# Patient Record
Sex: FEMALE | Race: WHITE | NOT HISPANIC OR LATINO | URBAN - METROPOLITAN AREA
[De-identification: names, ages, dates, MRNs, and addresses within clinical notes are randomized per-mention and may not be internally consistent; named-entity substitution may affect disease eponyms.]

---

## 2021-10-18 ENCOUNTER — HOSPITAL ENCOUNTER (OUTPATIENT)
Facility: CLINIC | Age: 21
Discharge: HOME | End: 2021-10-18
Attending: FAMILY MEDICINE
Payer: COMMERCIAL

## 2021-10-18 VITALS
RESPIRATION RATE: 16 BRPM | DIASTOLIC BLOOD PRESSURE: 70 MMHG | TEMPERATURE: 98.2 F | SYSTOLIC BLOOD PRESSURE: 120 MMHG | HEART RATE: 88 BPM | OXYGEN SATURATION: 98 %

## 2021-10-18 DIAGNOSIS — J01.40 ACUTE PANSINUSITIS, RECURRENCE NOT SPECIFIED: ICD-10-CM

## 2021-10-18 DIAGNOSIS — H60.503 ACUTE OTITIS EXTERNA OF BOTH EARS, UNSPECIFIED TYPE: Primary | ICD-10-CM

## 2021-10-18 DIAGNOSIS — H66.90 ACUTE OTITIS MEDIA, UNSPECIFIED OTITIS MEDIA TYPE: ICD-10-CM

## 2021-10-18 PROCEDURE — 99203 OFFICE O/P NEW LOW 30 MIN: CPT | Performed by: FAMILY MEDICINE

## 2021-10-18 PROCEDURE — S9083 URGENT CARE CENTER GLOBAL: HCPCS | Performed by: FAMILY MEDICINE

## 2021-10-18 RX ORDER — NEOMYCIN SULFATE, POLYMYXIN B SULFATE AND HYDROCORTISONE 10; 3.5; 1 MG/ML; MG/ML; [USP'U]/ML
4 SUSPENSION/ DROPS AURICULAR (OTIC) 3 TIMES DAILY
Qty: 4.2 ML | Refills: 0 | Status: SHIPPED | OUTPATIENT
Start: 2021-10-18 | End: 2021-10-25

## 2021-10-18 RX ORDER — AMOXICILLIN AND CLAVULANATE POTASSIUM 875; 125 MG/1; MG/1
1 TABLET, FILM COATED ORAL
Qty: 14 TABLET | Refills: 0 | Status: SHIPPED | OUTPATIENT
Start: 2021-10-18 | End: 2021-10-25

## 2021-10-18 ASSESSMENT — ENCOUNTER SYMPTOMS
SORE THROAT: 1
FEVER: 1
SINUS PRESSURE: 1
COUGH: 0
SINUS PAIN: 1

## 2021-10-18 NOTE — ED PROVIDER NOTES
History  No chief complaint on file.    20yoF presents c/o b/l ear pain and URI sx x 2 weeks. Sx include L>R ear pain. Denies drainage or changes in hearing. Notes a h/o frequent ear infections and says this feels similar. Thinks she may have had a fever. Also has congestion, sinus pressure, sore throat. Has not taken anything for her sx. Denies loss of smell or taste. Is fully vaccinated for Covid-19 and denies known Covid exposures. Says she tested negative for Covid last week.          No past medical history on file.    No past surgical history on file.    No family history on file.    Social History     Tobacco Use   • Smoking status: Not on file   • Smokeless tobacco: Not on file   Substance Use Topics   • Alcohol use: Not on file   • Drug use: Not on file       Review of Systems   Constitutional: Positive for fever.   HENT: Positive for congestion, ear pain, sinus pressure, sinus pain and sore throat. Negative for ear discharge and hearing loss.    Respiratory: Negative for cough.    All other systems reviewed and are negative.      Physical Exam  ED Triage Vitals [10/18/21 1618]   Temp Heart Rate Resp BP SpO2   36.8 °C (98.2 °F) 88 16 120/70 98 %      Temp Source Heart Rate Source Patient Position BP Location FiO2 (%) (Set)   Oral Monitor Sitting Left upper arm --       Physical Exam  Vitals reviewed.   Constitutional:       General: She is not in acute distress.     Appearance: Normal appearance. She is not ill-appearing.   HENT:      Head: Normocephalic and atraumatic.      Right Ear: Hearing and external ear normal. Drainage present. Tympanic membrane is erythematous.      Left Ear: Hearing and external ear normal. Drainage and tenderness present. A middle ear effusion is present. There is no impacted cerumen.      Ears:      Comments: Erythema in b/l canals     Nose: Congestion present.      Right Turbinates: Not enlarged.      Left Turbinates: Enlarged.      Right Sinus: Maxillary sinus tenderness and  frontal sinus tenderness present.      Left Sinus: Maxillary sinus tenderness and frontal sinus tenderness present.      Mouth/Throat:      Lips: Pink.      Mouth: Mucous membranes are moist.      Pharynx: Oropharynx is clear. Uvula midline. No oropharyngeal exudate or posterior oropharyngeal erythema.   Eyes:      Extraocular Movements: Extraocular movements intact.      Conjunctiva/sclera: Conjunctivae normal.   Cardiovascular:      Rate and Rhythm: Normal rate and regular rhythm.   Pulmonary:      Effort: Pulmonary effort is normal. No respiratory distress.      Breath sounds: Normal breath sounds.   Musculoskeletal:         General: Normal range of motion.      Cervical back: Normal range of motion and neck supple.   Lymphadenopathy:      Cervical: No cervical adenopathy.   Skin:     General: Skin is warm and dry.   Neurological:      General: No focal deficit present.      Mental Status: She is alert.   Psychiatric:         Mood and Affect: Mood normal.           Procedures  Procedures    UC Course  Clinical Impressions as of 10/18/21 1621   Acute otitis externa of both ears, unspecified type   Acute otitis media, unspecified otitis media type   Acute pansinusitis, recurrence not specified       MDM  Number of Diagnoses or Management Options  Acute otitis externa of both ears, unspecified type  Acute otitis media, unspecified otitis media type  Acute pansinusitis, recurrence not specified  Diagnosis management comments: augmentin and cortisporin otic rx  Flonase, tylenol/advil prn  F/u with PCP in a week or sooner if worse  Pt states understanding and is agreeable to plan                 Tami Marquez DO  10/18/21 1625

## 2021-10-18 NOTE — DISCHARGE INSTRUCTIONS
Take the antibiotic as prescribed  Apply the ear drops as prescribed  Flonase for congestion, runny nose, post nasal drip  Tylenol or Advil as needed for pain  Follow up with your Primary Doctor in a week or sooner if worse

## 2022-03-03 DIAGNOSIS — R10.2 PELVIC AND PERINEAL PAIN: ICD-10-CM

## 2023-03-20 ENCOUNTER — HOSPITAL ENCOUNTER (OUTPATIENT)
Facility: CLINIC | Age: 23
Discharge: HOME | End: 2023-03-20
Attending: FAMILY MEDICINE
Payer: COMMERCIAL

## 2023-03-20 VITALS
OXYGEN SATURATION: 98 % | HEART RATE: 83 BPM | DIASTOLIC BLOOD PRESSURE: 70 MMHG | RESPIRATION RATE: 17 BRPM | SYSTOLIC BLOOD PRESSURE: 131 MMHG | TEMPERATURE: 97.5 F

## 2023-03-20 DIAGNOSIS — J30.9 ALLERGIC SINUSITIS: Primary | ICD-10-CM

## 2023-03-20 PROCEDURE — S9083 URGENT CARE CENTER GLOBAL: HCPCS | Performed by: FAMILY MEDICINE

## 2023-03-20 PROCEDURE — 99213 OFFICE O/P EST LOW 20 MIN: CPT | Performed by: FAMILY MEDICINE

## 2023-03-20 RX ORDER — AMOXICILLIN 500 MG/1
500 CAPSULE ORAL 2 TIMES DAILY
Qty: 14 CAPSULE | Refills: 0 | Status: SHIPPED | OUTPATIENT
Start: 2023-03-20 | End: 2023-03-27

## 2023-03-20 ASSESSMENT — ENCOUNTER SYMPTOMS
SINUS PRESSURE: 1
NUMBNESS: 0
MYALGIAS: 0
CHILLS: 0
RHINORRHEA: 1
COUGH: 1
CHEST TIGHTNESS: 0
PALPITATIONS: 0
VOMITING: 0
DIZZINESS: 0
SORE THROAT: 0
SINUS PAIN: 0
FATIGUE: 0
FEVER: 0
ABDOMINAL PAIN: 0
SHORTNESS OF BREATH: 0
NAUSEA: 0
VOICE CHANGE: 1

## 2023-03-20 NOTE — DISCHARGE INSTRUCTIONS
Allergy season is upon us!     OTC medications recommended:   Xyzal- oral antihistamine   Rhinocort- Nasal Steroid (Budesonide 35mcg nasal spray), if Rhinocort is unavailable, next look for Nasocort (Triamcinolone 55mcg nasal spray)  __________________________________________________________  If eye irritation begins/persists:  OTC medication recommended-  Zaditor- Opthalmic (eye) Antihistamine drops.     Cool water rinsing recommended. Also, to clean the eye when it has mucous (in the mornings especially) I recommend Baby Shampoo and warm water on a washcloth to clean with the No Tears benefit.    Recommending a saline eyewash (with an eye cup for ease of use).    __________________________________________________________    Exercise proper hand hygiene.       **Check out [facility] for an allergen forecast!**   Roll up your car windows! Close those windows at home too! This will decrease your overall exposure to pollen.     **REMEMBER** Treat the PROBLEM (mucous) and your SYMPTOMS (cough, sore throat, congestion) will improve! The best remedy for mucous isn’t OTC medications, it’s saline (salt water; gargling AND sinus rinsing)!  Whatever mucous you rinse out early in your illness will decrease the chance of persistent lingering cough!    I personally recommend NeilMed All-In-One (find this over the counter or on Amazon), every 3-4hrs, 30 mins before bed and first thing in the AM. If you choose to use a nasal rinse that you must prepare,such as a Neti Pot or NeilMed squeeze bottle (remember, don't squeeze too hard!), please only use distilled water.     Salt Water gargles- as often as needed throughout the day.       Recommending no less than eight 8 oz glasses of water or tea.      Oil Diffuser with essential oils such as lavender, camphor, eucalyptus.     __________________________________________________________    Recommending Afrin (oxymetazoline) for additional relief.  You may use it NO MORE than twice  daily for NO MORE than 3 days MAX!  Refrain from using other nasal steroid (Nasacort, Rhinocort, Flonase) until you are no longer using Afrin      For additional treatment of Eustachian Tube Dysfunction:  Check out the Otovent or Eustaci for relief of discomfort from inner ear fluid build-up.    __________________________________________________________   You have been prescribed an antibiotic, only fill and take if symptoms progress/worsen.     Once started, please complete full course of antibiotic to prevent creating resistance within the bugs causing your illness.       Please eat a daily yogurt or take a daily Probiotic for GI health while taking antibiotics.  Recommending the probiotic Digestive Advantage (safe for patients 3yrs and older).    Remember to take the Probiotic *no sooner* than 4 hours *after* taking your antibiotic.  If you take the probiotic sooner than that, the good bacteria will likely be killed by the antibiotic.   __________________________________________________________    Please let us know about your experience today!   Your input is truly appreciated and helps Dr. Vivar and your team at [facility] to continue to provide a superior level of service!   Get Well Soon!

## 2023-03-20 NOTE — ED PROVIDER NOTES
History  No chief complaint on file.    Esme is a 23 yo female presenting with sinus pressure and ear pain  for  Several weeks.   She states she has a history of ear infections.          History provided by:  Patient   used: No        History reviewed. No pertinent past medical history.    History reviewed. No pertinent surgical history.    History reviewed. No pertinent family history.         Review of Systems   Constitutional: Negative for chills, fatigue and fever.   HENT: Positive for congestion, rhinorrhea, sinus pressure and voice change. Negative for sinus pain, sneezing and sore throat.    Respiratory: Positive for cough. Negative for chest tightness and shortness of breath.    Cardiovascular: Negative for chest pain and palpitations.   Gastrointestinal: Negative for abdominal pain, nausea and vomiting.   Musculoskeletal: Negative for myalgias.   Neurological: Negative for dizziness and numbness.       Physical Exam  ED Triage Vitals [03/20/23 1746]   Temp Heart Rate Resp BP SpO2   36.4 °C (97.5 °F) 83 17 131/70 98 %      Temp Source Heart Rate Source Patient Position BP Location FiO2 (%) (Set)   Oral Monitor Sitting Right upper arm --       Physical Exam  Constitutional:       General: She is not in acute distress.     Appearance: Normal appearance. She is not ill-appearing.   HENT:      Head: Normocephalic.      Nose: Congestion and rhinorrhea present.   Eyes:      Extraocular Movements: Extraocular movements intact.      Conjunctiva/sclera: Conjunctivae normal.   Cardiovascular:      Rate and Rhythm: Normal rate and regular rhythm.      Pulses: Normal pulses.      Heart sounds: Normal heart sounds. No murmur heard.     No friction rub. No gallop.   Pulmonary:      Effort: Pulmonary effort is normal. No respiratory distress.      Breath sounds: No wheezing, rhonchi or rales.   Musculoskeletal:         General: Normal range of motion.      Cervical back: Normal range of motion.    Lymphadenopathy:      Cervical: No cervical adenopathy.   Skin:     General: Skin is warm.      Capillary Refill: Capillary refill takes less than 2 seconds.   Neurological:      General: No focal deficit present.      Mental Status: She is alert and oriented to person, place, and time.           Procedures  Procedures    UC Course       Medical Decision Making  Sinusitis, likely allergic, Helpful allergy season recommendations and tips provided on discharge.   Recommending Nasal saline, Salt Water gargle  Urged to seek immediate medical treatment should symptoms worsen.    Oral Antibiotic prescription printed as prn. The patient verbally understands the signs & symptoms to monitor to warrant starting oral antibiotic.   Recommending a daily yogurt or Probiotic for GI health while taking antibiotics.  To take the Probiotic *no sooner* than 4 hours *after* taking the antibiotic to prevent the good bacteria from being killed by the antibiotic.   Urged to seek immediate medical treatment should symptoms worsen.                     Brandon Vivar DO  03/20/23 1758